# Patient Record
(demographics unavailable — no encounter records)

---

## 2025-04-24 NOTE — HISTORY OF PRESENT ILLNESS
[FreeTextEntry1] : Steve is a three-year-old male who was referred for evaluation of in-toeing concerns. The family reported his history of being very bow-legged, although it seems to be improving, and ongoing issues with his feet turning in significantly. Physical therapy was initiated, and visits to pediatric orthopedists have occurred, with little improvement noted. People frequently comment on his foot positioning due to its visibility and his tendency to trip and fall. Developmentally, Steve had a standard pregnancy and delivery, despite a clavicle fracture at birth, which resolved quickly. He required a helmet for a flat head but had no diagnosed torticollis. He qualified for early intervention services due to behavioral concerns but has displayed timely physical milestones such as walking. Steve manages typical tasks like feeding and drinking from a cup without issue but falls frequently, leading the family to seek opinions on potential interventions.  Developmental History:   -- Reported timely physical milestones with early walking but later challenges navigating stairs and outdoor walking  Gross Motor:   -- Falls frequently, possibly due behavioral concerns -- Participates in early intervention services, improving steady walking  Fine Motor:   -- No reported issues; manages feeding tasks independently  ADLs:   -- Independently feeds himself and drinks from a cup  Diet:   -- Eats well without choking or gagging  Pain:   -- No reports of pain during activities  Physical Activity:   -- Engages in physical therapy sessions for coordination improvement   -- Family considering vision assessment due to frequent falls  Equipment:   -- Previously used a helmet for brachycephaly  Services:   -- PT: Active participation in school, focused mostly on stretching to improve foot positioning.

## 2025-04-24 NOTE — PHYSICAL EXAM
[FreeTextEntry1] : General: Well-nourished individual in no acute distress   Skin: Grossly negative for erythema, breakdown, or concerning lesions in affected area   Lung: Breathing is comfortable and regular.  Mental: Normal speech and thought processing for age. Easily distracted.  Neurologic: Displays internal rotation of hips during gait, normal stride.  Normal tone.  No spasticity.  No clonus.  Reflexes normal. Musculoskeletal:   -- Spine: Normal range of motion observed   -- Joint ROM: Thig-foot angle was essentially at neutral bilaterally.  Galeazzi negative.  Significantly increased femoral anteversion bilaterally to at 90 degrees on each side.  No significant leg length discrepancy.  Slight bowlegged appearance while walking.

## 2025-04-24 NOTE — ASSESSMENT
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 His internal hip rotation is identified as the primary contributor, a common developmental pattern without pain or acute symptoms currently. It's further suggested that behavioral distractions might contribute to frequent falls.  Plan:   1. Recommended focusing on games and activities that strengthen gluteal muscles, rather than merely targeting in-toeing foot interventions. PT Rx provided to help guide school PT.  2. No structural interventions needed at present; discussion on avoiding surgery due to its invasiveness and expected natural improvement over time.  Plan was reviewed with his parents as described above and all questions answered accordingly. They demonstrated understanding of therapy options and agreed with the treatment plan.  ---------------------------------------- This note was created using Dragon Voice Recognition Software and may have been partially created using [employer] software which was then reviewed and edited to the best of my ability. Sporadic inaccurate translation may have occurred.